# Patient Record
Sex: FEMALE | Race: BLACK OR AFRICAN AMERICAN | URBAN - METROPOLITAN AREA
[De-identification: names, ages, dates, MRNs, and addresses within clinical notes are randomized per-mention and may not be internally consistent; named-entity substitution may affect disease eponyms.]

---
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
## 2025-05-05 ENCOUNTER — HOSPITAL ENCOUNTER (EMERGENCY)
Age: 20
Discharge: HOME | End: 2025-05-05
Payer: COMMERCIAL

## 2025-05-05 VITALS
DIASTOLIC BLOOD PRESSURE: 79 MMHG | OXYGEN SATURATION: 100 % | HEIGHT: 60 IN | WEIGHT: 267.86 LBS | SYSTOLIC BLOOD PRESSURE: 166 MMHG | HEART RATE: 97 BPM | TEMPERATURE: 98.5 F | BODY MASS INDEX: 52.59 KG/M2 | RESPIRATION RATE: 17 BRPM

## 2025-05-05 DIAGNOSIS — J02.0 STREPTOCOCCAL SORE THROAT: Primary | ICD-10-CM

## 2025-05-05 LAB — S PYO AG THROAT QL: POSITIVE

## 2025-05-05 PROCEDURE — 6370000000 HC RX 637 (ALT 250 FOR IP): Performed by: PHYSICIAN ASSISTANT

## 2025-05-05 PROCEDURE — 99283 EMERGENCY DEPT VISIT LOW MDM: CPT

## 2025-05-05 PROCEDURE — 87880 STREP A ASSAY W/OPTIC: CPT

## 2025-05-05 RX ORDER — PENICILLIN V POTASSIUM 500 MG/1
500 TABLET, FILM COATED ORAL 3 TIMES DAILY
Qty: 30 TABLET | Refills: 0 | Status: SHIPPED | OUTPATIENT
Start: 2025-05-05 | End: 2025-05-15

## 2025-05-05 RX ORDER — PENICILLIN V POTASSIUM 500 MG/1
500 TABLET, FILM COATED ORAL ONCE
Status: COMPLETED | OUTPATIENT
Start: 2025-05-05 | End: 2025-05-05

## 2025-05-05 RX ADMIN — PENICILLIN V POTASSIUM 500 MG: 500 TABLET, FILM COATED ORAL at 15:27

## 2025-05-05 ASSESSMENT — PAIN SCALES - GENERAL: PAINLEVEL_OUTOF10: 5

## 2025-05-05 ASSESSMENT — LIFESTYLE VARIABLES
HOW MANY STANDARD DRINKS CONTAINING ALCOHOL DO YOU HAVE ON A TYPICAL DAY: PATIENT DOES NOT DRINK
HOW OFTEN DO YOU HAVE A DRINK CONTAINING ALCOHOL: NEVER

## 2025-05-05 ASSESSMENT — PAIN DESCRIPTION - PAIN TYPE: TYPE: ACUTE PAIN

## 2025-05-05 ASSESSMENT — PAIN - FUNCTIONAL ASSESSMENT
PAIN_FUNCTIONAL_ASSESSMENT: 0-10
PAIN_FUNCTIONAL_ASSESSMENT: ACTIVITIES ARE NOT PREVENTED

## 2025-05-05 ASSESSMENT — PAIN DESCRIPTION - LOCATION: LOCATION: THROAT

## 2025-05-05 NOTE — DISCHARGE INSTRUCTIONS
If you begin with trouble breathing, difficulty swallowing, fevers, significant increase in swelling to 1 tonsil seek reevaluation.  Regarding your mucus production overnight try an antihistamine like Zyrtec once a day for a week to see if this helps symptoms.  Follow-up with primary care doctor as outpatient.  Your blood pressure was mildly elevated in the ED, follow-up as an outpatient check outside ER.  Have a nice vacation.

## 2025-05-05 NOTE — ED PROVIDER NOTES
[facility] EMERGENCY DEPARTMENT  EMERGENCY DEPARTMENT ENCOUNTER        Pt Name: Wendi Rey  MRN: [id number]  Birthdate 2005  Date of evaluation: 5/5/2025  Provider: Marisela Jackson PA-C  PCP: PATRICIO Diaz  Note Started: 3:00 PM EDT 5/5/25      CARLA. I have evaluated this patient.        CHIEF COMPLAINT       Chief Complaint   Patient presents with    Pharyngitis     Starting Saturday, endorses difficulty swallowing and pain.        HISTORY OF PRESENT ILLNESS: 1 or more Elements     History From: patient  Limitations to history : None    Wendi Rey is a 19 y.o. female who presents to the emergency department by private vehicle complaining of sore throat and swollen tonsils for the past 2 days.  Patient concern for strep throat.  She has had it a couple times in the past, symptoms similar to previous episodes.  She denies any fevers, chills, cough, rhinorrhea, congestion.  No known sick contacts.  Has pain with swallowing but no difficulty swallowing or breathing. No other complaints this time    Nursing Notes were all reviewed and agreed with or any disagreements were addressed in the HPI.    REVIEW OF SYSTEMS :      Review of Systems    Positives and Pertinent negatives as per HPI.     SURGICAL HISTORY   No past surgical history on file.    CURRENTMEDICATIONS       Previous Medications    ALBUTEROL SULFATE  (90 BASE) MCG/ACT INHALER    Inhale 2 puffs into the lungs every 6 hours as needed for Wheezing    CETIRIZINE (ZYRTEC CHILDRENS ALLERGY) 10 MG CHEWABLE TABLET    Take 1 tablet by mouth daily    DIPHENHYDRAMINE (BENADRYL ALLERGY CHILDRENS) 12.5 MG CHEWABLE TABLET    Take 1 tablet by mouth 4 times daily as needed for Itching or Allergies    IBUPROFEN (ADVIL;MOTRIN) 600 MG TABLET    Take 1 tablet by mouth every 6 hours as needed for Pain       ALLERGIES     Patient has no known allergies.    FAMILYHISTORY     No family history on file.     SOCIAL HISTORY       Social

## 2025-05-05 NOTE — ED NOTES
Patient DCed from ED at this time. Discussed AVS, follow up, and scripts. They verbalized understanding. Reinforced that should symptoms persist or worsen to return to the ED. They verbalized understanding. Patient ambulated out of ED. RN thanked patient for choosing [facility].